# Patient Record
Sex: FEMALE | URBAN - METROPOLITAN AREA
[De-identification: names, ages, dates, MRNs, and addresses within clinical notes are randomized per-mention and may not be internally consistent; named-entity substitution may affect disease eponyms.]

---

## 2017-08-21 ENCOUNTER — IMPORTED ENCOUNTER (OUTPATIENT)
Dept: URBAN - METROPOLITAN AREA CLINIC 9 | Facility: CLINIC | Age: 67
End: 2017-08-21

## 2020-03-04 ENCOUNTER — IMPORTED ENCOUNTER (OUTPATIENT)
Dept: URBAN - METROPOLITAN AREA CLINIC 9 | Facility: CLINIC | Age: 70
End: 2020-03-04

## 2020-03-04 PROBLEM — H43.813: Noted: 2020-03-04

## 2021-10-17 ASSESSMENT — KERATOMETRY
OD_K1POWER_DIOPTERS: 44.5
OD_AXISANGLE_DEGREES: 169
OS_AXISANGLE_DEGREES: 27
OS_AXISANGLE_DEGREES: 31
OD_K1POWER_DIOPTERS: 44.25
OS_K2POWER_DIOPTERS: 45.5
OD_AXISANGLE_DEGREES: 179
OS_K1POWER_DIOPTERS: 45
OD_K2POWER_DIOPTERS: 45.25
OD_AXISANGLE2_DEGREES: 89
OS_K2POWER_DIOPTERS: 45.75
OD_K2POWER_DIOPTERS: 45.25
OS_AXISANGLE2_DEGREES: 121
OS_AXISANGLE2_DEGREES: 117
OS_K1POWER_DIOPTERS: 44.75
OD_AXISANGLE2_DEGREES: 79

## 2021-10-17 ASSESSMENT — VISUAL ACUITY
OD_CC: 20/20 - SN
OS_CC: 20/25 SN
OS_SC: 20/20 - SN
OD_CC: 20/20 -2 SN
OS_CC: 20/20 -2 SN
OS_CC: 20/20 - SN
OS_CC: 20/30 SN
OD_SC: 20/20 - SN
OS_CC: 20/20 - SN
OD_CC: 20/20 - SN
OD_CC: 20/30 SN
OD_CC: 20/25 -2 SN

## 2021-10-17 ASSESSMENT — TONOMETRY
OD_IOP_MMHG: 14
OD_IOP_MMHG: 12
OS_IOP_MMHG: 14
OS_IOP_MMHG: 12

## 2022-07-07 RX ORDER — MONTELUKAST SODIUM 10 MG/1
TABLET ORAL
COMMUNITY

## 2022-07-07 RX ORDER — ALPRAZOLAM 0.25 MG/1
TABLET ORAL
COMMUNITY

## 2022-07-07 RX ORDER — ALBUTEROL SULFATE 90 UG/1
AEROSOL, METERED RESPIRATORY (INHALATION)
COMMUNITY

## 2022-07-07 RX ORDER — FLUTICASONE PROPIONATE 50 MCG
SPRAY, SUSPENSION (ML) NASAL
COMMUNITY

## 2022-07-07 RX ORDER — TOPIRAMATE 50 MG/1
TABLET, FILM COATED ORAL
COMMUNITY

## 2022-07-07 RX ORDER — IPRATROPIUM BROMIDE AND ALBUTEROL SULFATE 2.5; .5 MG/3ML; MG/3ML
SOLUTION RESPIRATORY (INHALATION)
COMMUNITY

## 2022-07-07 RX ORDER — BUDESONIDE AND FORMOTEROL FUMARATE DIHYDRATE 160; 4.5 UG/1; UG/1
AEROSOL RESPIRATORY (INHALATION)
COMMUNITY

## 2023-12-15 ENCOUNTER — NEW PATIENT (OUTPATIENT)
Dept: URBAN - METROPOLITAN AREA CLINIC 8 | Facility: CLINIC | Age: 73
End: 2023-12-15

## 2023-12-15 DIAGNOSIS — H52.13: ICD-10-CM

## 2023-12-15 DIAGNOSIS — H43.813: ICD-10-CM

## 2023-12-15 PROCEDURE — 92015 DETERMINE REFRACTIVE STATE: CPT

## 2023-12-15 PROCEDURE — 92014 COMPRE OPH EXAM EST PT 1/>: CPT

## 2023-12-15 ASSESSMENT — TONOMETRY
OD_IOP_MMHG: 21
OS_IOP_MMHG: 16

## 2023-12-15 ASSESSMENT — KERATOMETRY
OD_AXISANGLE_DEGREES: 169
OD_K2POWER_DIOPTERS: 45.25
OD_AXISANGLE2_DEGREES: 79
OS_AXISANGLE_DEGREES: 31
OS_AXISANGLE2_DEGREES: 121
OD_K1POWER_DIOPTERS: 44.5
OS_K2POWER_DIOPTERS: 45.75
OS_K1POWER_DIOPTERS: 45

## 2023-12-15 ASSESSMENT — VISUAL ACUITY
OD_CC: J1
OD_CC: 20/20
OS_CC: 20/20-1
OS_CC: J1+

## 2025-04-01 NOTE — PATIENT DISCUSSION
----- Message from Estefany sent at 4/1/2025  1:54 PM CDT -----  Contact: April  Patient is calling regarding previously visit, Asking for a nurse to call her at .[phone]. Asking what previous visit was form , informed pt it was a hypertension f/u, pt still wanting to speak.   Consider OS to follow. EYE OS, IOL TYPE BASIC, POST OPERATIVE TARGET PL/-0.50, PACKAGE BASIC.